# Patient Record
Sex: MALE | Race: BLACK OR AFRICAN AMERICAN | NOT HISPANIC OR LATINO | URBAN - METROPOLITAN AREA
[De-identification: names, ages, dates, MRNs, and addresses within clinical notes are randomized per-mention and may not be internally consistent; named-entity substitution may affect disease eponyms.]

---

## 2019-12-17 VITALS
HEART RATE: 62 BPM | TEMPERATURE: 98 F | SYSTOLIC BLOOD PRESSURE: 138 MMHG | RESPIRATION RATE: 18 BRPM | OXYGEN SATURATION: 96 % | HEIGHT: 73 IN | WEIGHT: 185.19 LBS | DIASTOLIC BLOOD PRESSURE: 71 MMHG

## 2019-12-17 NOTE — ASU PATIENT PROFILE, ADULT - PSH
H/O prostatectomy    History of hip replacement  left  History of knee replacement, total, bilateral    History of vocal cord polypectomy    Other postprocedural status  S/P wrist surgery, right  Other postprocedural status  S/P foot surgery, left

## 2019-12-17 NOTE — ASU PATIENT PROFILE, ADULT - PMH
Anxiety state  Anxiety  Arthropathy  Arthritis  Atherosclerosis of coronary artery  CAD (coronary artery disease)  Atrial fibrillation    Cardiomyopathy    Depression    Essential hypertension  HTN (hypertension)  Gastroesophageal reflux disease  GERD (gastroesophageal reflux disease)  Hypercholesterolemia  High cholesterol  Malignant neoplasm of prostate  Prostate cancer  Premature beats  PVC (premature ventricular contraction)  Sciatica  Sciatica

## 2019-12-18 ENCOUNTER — OUTPATIENT (OUTPATIENT)
Dept: OUTPATIENT SERVICES | Facility: HOSPITAL | Age: 69
LOS: 1 days | Discharge: HOME | End: 2019-12-18
Payer: MEDICARE

## 2019-12-18 VITALS
OXYGEN SATURATION: 97 % | HEART RATE: 56 BPM | DIASTOLIC BLOOD PRESSURE: 74 MMHG | RESPIRATION RATE: 16 BRPM | SYSTOLIC BLOOD PRESSURE: 130 MMHG

## 2019-12-18 DIAGNOSIS — Z96.649 PRESENCE OF UNSPECIFIED ARTIFICIAL HIP JOINT: Chronic | ICD-10-CM

## 2019-12-18 DIAGNOSIS — Z90.79 ACQUIRED ABSENCE OF OTHER GENITAL ORGAN(S): Chronic | ICD-10-CM

## 2019-12-18 DIAGNOSIS — Z98.890 OTHER SPECIFIED POSTPROCEDURAL STATES: Chronic | ICD-10-CM

## 2019-12-18 DIAGNOSIS — Z96.653 PRESENCE OF ARTIFICIAL KNEE JOINT, BILATERAL: Chronic | ICD-10-CM

## 2019-12-18 PROCEDURE — 53445 INSERT URO/VES NCK SPHINCTER: CPT

## 2019-12-18 PROCEDURE — C1815: CPT

## 2019-12-18 RX ORDER — METOCLOPRAMIDE HCL 10 MG
10 TABLET ORAL ONCE
Refills: 0 | Status: DISCONTINUED | OUTPATIENT
Start: 2019-12-18 | End: 2019-12-18

## 2019-12-18 RX ORDER — ONDANSETRON 8 MG/1
4 TABLET, FILM COATED ORAL EVERY 6 HOURS
Refills: 0 | Status: DISCONTINUED | OUTPATIENT
Start: 2019-12-18 | End: 2019-12-18

## 2019-12-18 RX ORDER — VANCOMYCIN HCL 1 G
1250 VIAL (EA) INTRAVENOUS ONCE
Refills: 0 | Status: COMPLETED | OUTPATIENT
Start: 2019-12-18 | End: 2019-12-18

## 2019-12-18 RX ORDER — SODIUM CHLORIDE 9 MG/ML
1000 INJECTION, SOLUTION INTRAVENOUS
Refills: 0 | Status: DISCONTINUED | OUTPATIENT
Start: 2019-12-18 | End: 2019-12-18

## 2019-12-18 RX ORDER — OXYCODONE AND ACETAMINOPHEN 5; 325 MG/1; MG/1
2 TABLET ORAL EVERY 6 HOURS
Refills: 0 | Status: DISCONTINUED | OUTPATIENT
Start: 2019-12-18 | End: 2019-12-18

## 2019-12-18 RX ORDER — HYDROMORPHONE HYDROCHLORIDE 2 MG/ML
0.5 INJECTION INTRAMUSCULAR; INTRAVENOUS; SUBCUTANEOUS
Refills: 0 | Status: DISCONTINUED | OUTPATIENT
Start: 2019-12-18 | End: 2019-12-18

## 2019-12-18 RX ORDER — OXYCODONE AND ACETAMINOPHEN 5; 325 MG/1; MG/1
1 TABLET ORAL EVERY 4 HOURS
Refills: 0 | Status: DISCONTINUED | OUTPATIENT
Start: 2019-12-18 | End: 2019-12-18

## 2019-12-18 RX ORDER — GENTAMICIN SULFATE 40 MG/ML
80 VIAL (ML) INJECTION ONCE
Refills: 0 | Status: DISCONTINUED | OUTPATIENT
Start: 2019-12-18 | End: 2019-12-18

## 2019-12-18 RX ADMIN — Medication 166.67 MILLIGRAM(S): at 11:44

## 2020-06-15 NOTE — ASU PREOP CHECKLIST - BLOOD AVAILABLE
n/a Transposition Flap Text: The defect edges were debeveled with a #15 scalpel blade.  Given the location of the defect and the proximity to free margins a transposition flap was deemed most appropriate.  Using a sterile surgical marker, an appropriate transposition flap was drawn incorporating the defect.    The area thus outlined was incised deep to adipose tissue with a #15 scalpel blade.  The skin margins were undermined to an appropriate distance in all directions utilizing iris scissors.

## 2022-03-06 PROBLEM — I42.9 CARDIOMYOPATHY, UNSPECIFIED: Chronic | Status: ACTIVE | Noted: 2019-12-17

## 2022-03-06 PROBLEM — F32.9 MAJOR DEPRESSIVE DISORDER, SINGLE EPISODE, UNSPECIFIED: Chronic | Status: ACTIVE | Noted: 2019-12-17

## 2022-03-06 PROBLEM — I48.91 UNSPECIFIED ATRIAL FIBRILLATION: Chronic | Status: ACTIVE | Noted: 2019-12-17

## 2022-03-16 ENCOUNTER — OUTPATIENT (OUTPATIENT)
Dept: OUTPATIENT SERVICES | Facility: HOSPITAL | Age: 72
LOS: 1 days | End: 2022-03-16
Payer: MEDICARE

## 2022-03-16 DIAGNOSIS — Z01.818 ENCOUNTER FOR OTHER PREPROCEDURAL EXAMINATION: ICD-10-CM

## 2022-03-16 DIAGNOSIS — Z98.890 OTHER SPECIFIED POSTPROCEDURAL STATES: Chronic | ICD-10-CM

## 2022-03-16 DIAGNOSIS — Z96.649 PRESENCE OF UNSPECIFIED ARTIFICIAL HIP JOINT: Chronic | ICD-10-CM

## 2022-03-16 DIAGNOSIS — Z96.653 PRESENCE OF ARTIFICIAL KNEE JOINT, BILATERAL: Chronic | ICD-10-CM

## 2022-03-16 DIAGNOSIS — Z90.79 ACQUIRED ABSENCE OF OTHER GENITAL ORGAN(S): Chronic | ICD-10-CM

## 2022-03-16 PROCEDURE — 93010 ELECTROCARDIOGRAM REPORT: CPT

## 2022-03-16 PROCEDURE — 93005 ELECTROCARDIOGRAM TRACING: CPT

## 2022-03-17 RX ORDER — GENTAMICIN SULFATE 40 MG/ML
350 VIAL (ML) INJECTION ONCE
Refills: 0 | Status: DISCONTINUED | OUTPATIENT
Start: 2022-03-18 | End: 2022-03-18

## 2022-03-17 RX ORDER — VANCOMYCIN HCL 1 G
1750 VIAL (EA) INTRAVENOUS ONCE
Refills: 0 | Status: COMPLETED | OUTPATIENT
Start: 2022-03-18 | End: 2022-03-18

## 2022-03-18 ENCOUNTER — OUTPATIENT (OUTPATIENT)
Dept: OUTPATIENT SERVICES | Facility: HOSPITAL | Age: 72
LOS: 1 days | Discharge: HOME | End: 2022-03-18

## 2022-03-18 VITALS
OXYGEN SATURATION: 96 % | RESPIRATION RATE: 15 BRPM | HEART RATE: 92 BPM | DIASTOLIC BLOOD PRESSURE: 73 MMHG | TEMPERATURE: 98 F | SYSTOLIC BLOOD PRESSURE: 137 MMHG

## 2022-03-18 VITALS
WEIGHT: 223.55 LBS | SYSTOLIC BLOOD PRESSURE: 154 MMHG | OXYGEN SATURATION: 97 % | RESPIRATION RATE: 16 BRPM | DIASTOLIC BLOOD PRESSURE: 78 MMHG | HEART RATE: 89 BPM | HEIGHT: 73 IN | TEMPERATURE: 97 F

## 2022-03-18 DIAGNOSIS — Z96.649 PRESENCE OF UNSPECIFIED ARTIFICIAL HIP JOINT: Chronic | ICD-10-CM

## 2022-03-18 DIAGNOSIS — Z98.890 OTHER SPECIFIED POSTPROCEDURAL STATES: Chronic | ICD-10-CM

## 2022-03-18 DIAGNOSIS — Z90.79 ACQUIRED ABSENCE OF OTHER GENITAL ORGAN(S): Chronic | ICD-10-CM

## 2022-03-18 DIAGNOSIS — Z96.653 PRESENCE OF ARTIFICIAL KNEE JOINT, BILATERAL: Chronic | ICD-10-CM

## 2022-03-18 DEVICE — RESERVIOR TITAN CLOVERLEAF 125CC: Type: IMPLANTABLE DEVICE | Status: FUNCTIONAL

## 2022-03-18 DEVICE — IMP PENILE TITAN SCROTAL 0 ANGLE 18CM: Type: IMPLANTABLE DEVICE | Status: FUNCTIONAL

## 2022-03-18 DEVICE — KIT PENILE TITAN ASSEMBLY STANDARD: Type: IMPLANTABLE DEVICE | Status: FUNCTIONAL

## 2022-03-18 DEVICE — SURGIFLO HEMOSTATIC MATRIX KIT: Type: IMPLANTABLE DEVICE | Status: FUNCTIONAL

## 2022-03-18 RX ORDER — APALUTAMIDE 240 MG/1
4 TABLET, FILM COATED ORAL
Qty: 0 | Refills: 0 | DISCHARGE

## 2022-03-18 RX ORDER — ATORVASTATIN CALCIUM 80 MG/1
1 TABLET, FILM COATED ORAL
Qty: 0 | Refills: 0 | DISCHARGE

## 2022-03-18 RX ORDER — OXYCODONE AND ACETAMINOPHEN 5; 325 MG/1; MG/1
1 TABLET ORAL ONCE
Refills: 0 | Status: DISCONTINUED | OUTPATIENT
Start: 2022-03-18 | End: 2022-03-18

## 2022-03-18 RX ORDER — ACETAMINOPHEN 500 MG
650 TABLET ORAL ONCE
Refills: 0 | Status: DISCONTINUED | OUTPATIENT
Start: 2022-03-18 | End: 2022-03-18

## 2022-03-18 RX ORDER — SODIUM CHLORIDE 9 MG/ML
1000 INJECTION, SOLUTION INTRAVENOUS
Refills: 0 | Status: DISCONTINUED | OUTPATIENT
Start: 2022-03-18 | End: 2022-03-18

## 2022-03-18 RX ORDER — CHLORHEXIDINE GLUCONATE 213 G/1000ML
1 SOLUTION TOPICAL ONCE
Refills: 0 | Status: COMPLETED | OUTPATIENT
Start: 2022-03-18 | End: 2022-03-18

## 2022-03-18 RX ORDER — BENAZEPRIL HYDROCHLORIDE 40 MG/1
1 TABLET ORAL
Qty: 0 | Refills: 0 | DISCHARGE

## 2022-03-18 RX ORDER — AMLODIPINE BESYLATE 2.5 MG/1
1 TABLET ORAL
Qty: 0 | Refills: 0 | DISCHARGE

## 2022-03-18 RX ORDER — ONDANSETRON 8 MG/1
4 TABLET, FILM COATED ORAL ONCE
Refills: 0 | Status: DISCONTINUED | OUTPATIENT
Start: 2022-03-18 | End: 2022-03-18

## 2022-03-18 RX ORDER — LEVOTHYROXINE SODIUM 125 MCG
1 TABLET ORAL
Qty: 0 | Refills: 0 | DISCHARGE

## 2022-03-18 RX ORDER — GABAPENTIN 400 MG/1
1 CAPSULE ORAL
Qty: 0 | Refills: 0 | DISCHARGE

## 2022-03-18 RX ADMIN — CHLORHEXIDINE GLUCONATE 1 APPLICATION(S): 213 SOLUTION TOPICAL at 06:12

## 2022-03-18 RX ADMIN — Medication 125 MILLIGRAM(S): at 06:42

## 2022-03-18 RX ADMIN — SODIUM CHLORIDE 75 MILLILITER(S): 9 INJECTION, SOLUTION INTRAVENOUS at 10:16

## 2022-03-18 NOTE — ASU DISCHARGE PLAN (ADULT/PEDIATRIC) - CARE PROVIDER_API CALL
Sergey Iqbal)  Urology  [address] [place] [address]  Phone: [phone]  Fax: [phone]  Follow Up Time:

## 2022-03-18 NOTE — ASU PATIENT PROFILE, ADULT - NSICDXPASTSURGICALHX_GEN_ALL_CORE_FT
PAST SURGICAL HISTORY:  H/O prostatectomy     History of hip replacement left    History of knee replacement, total, bilateral     History of vocal cord polypectomy     Other postprocedural status S/P foot surgery, left    Other postprocedural status S/P wrist surgery, right

## 2022-03-18 NOTE — ASU PATIENT PROFILE, ADULT - NSICDXPASTMEDICALHX_GEN_ALL_CORE_FT
PAST MEDICAL HISTORY:  Anxiety state Anxiety    Arthropathy Arthritis    Atherosclerosis of coronary artery CAD (coronary artery disease)    Atrial fibrillation     Cardiomyopathy     Depression     Essential hypertension HTN (hypertension)    Gastroesophageal reflux disease GERD (gastroesophageal reflux disease)    Hypercholesterolemia High cholesterol    Malignant neoplasm of prostate Prostate cancer    Premature beats PVC (premature ventricular contraction)    Sciatica Sciatica

## 2022-03-18 NOTE — ASU DISCHARGE PLAN (ADULT/PEDIATRIC) - NS MD DC FALL RISK RISK
For information on Fall & Injury Prevention, visit: [email or web address] OR  [email or web address] OR  https://www.cdc.gov/steadi/patient.html

## 2023-12-11 PROBLEM — Z00.00 ENCOUNTER FOR PREVENTIVE HEALTH EXAMINATION: Status: ACTIVE | Noted: 2023-12-11

## (undated) DEVICE — SUT PDS II 2-0 27" SH

## (undated) DEVICE — PREP CHLORAPREP HI-LITE ORANGE 26ML

## (undated) DEVICE — SUT VICRYL 3-0 27" RB-1 UNDYED

## (undated) DEVICE — TAPE UMBILICAL 1/8 X 18" STRANDS

## (undated) DEVICE — DRSG COMBINE 5X9"

## (undated) DEVICE — DRAIN URINARY LEG BAG WITH FLIP-FLO VALVE 32OZ

## (undated) DEVICE — SUT PROLENE 4-0 14" V-47

## (undated) DEVICE — DRSG DERMABOND 0.7ML

## (undated) DEVICE — SUPP ATHLETIC MALE XLG 44-55IN

## (undated) DEVICE — BAG DECANTER IV STERILE

## (undated) DEVICE — WARMING BLANKET UPPER ADULT

## (undated) DEVICE — PACK PROST PENILE LNX SURGICOUNT

## (undated) DEVICE — GLV 9 PROTEXIS (WHITE)

## (undated) DEVICE — LONE STAR DILAMEZINSERT INSERTER BLUE 12MM

## (undated) DEVICE — MARKING PEN DEVON DUAL TIP W RULER

## (undated) DEVICE — BAG SPONGE COUNTER EZ

## (undated) DEVICE — SUT PDS II 3-0 27" RB-1

## (undated) DEVICE — PREP SCRUB BRUSH W CHG 4%

## (undated) DEVICE — SLV COMPRESSION KNEE MED

## (undated) DEVICE — LONE STAR ELASTIC STAYS 12MM BLUNT